# Patient Record
Sex: MALE | Race: BLACK OR AFRICAN AMERICAN | NOT HISPANIC OR LATINO | URBAN - METROPOLITAN AREA
[De-identification: names, ages, dates, MRNs, and addresses within clinical notes are randomized per-mention and may not be internally consistent; named-entity substitution may affect disease eponyms.]

---

## 2023-06-21 ENCOUNTER — APPOINTMENT (RX ONLY)
Dept: URBAN - METROPOLITAN AREA CLINIC 66 | Facility: CLINIC | Age: 48
Setting detail: DERMATOLOGY
End: 2023-06-21

## 2023-06-21 DIAGNOSIS — R21 RASH AND OTHER NONSPECIFIC SKIN ERUPTION: ICD-10-CM | Status: INADEQUATELY CONTROLLED

## 2023-06-21 PROCEDURE — ? ORDER TESTS

## 2023-06-21 PROCEDURE — 99204 OFFICE O/P NEW MOD 45 MIN: CPT

## 2023-06-21 PROCEDURE — ? FULL BODY SKIN EXAM - DECLINED

## 2023-06-21 PROCEDURE — ? PRESCRIPTION MEDICATION MANAGEMENT

## 2023-06-21 PROCEDURE — ? PRESCRIPTION

## 2023-06-21 PROCEDURE — ? COUNSELING

## 2023-06-21 PROCEDURE — ? MEDICATION COUNSELING

## 2023-06-21 PROCEDURE — ? ADDITIONAL NOTES

## 2023-06-21 RX ORDER — TACROLIMUS 1 MG/G
OINTMENT TOPICAL
Qty: 60 | Refills: 2 | Status: ERX | COMMUNITY
Start: 2023-06-21

## 2023-06-21 RX ADMIN — TACROLIMUS: 1 OINTMENT TOPICAL at 00:00

## 2023-06-21 ASSESSMENT — LOCATION SIMPLE DESCRIPTION DERM: LOCATION SIMPLE: RIGHT LIP

## 2023-06-21 ASSESSMENT — LOCATION DETAILED DESCRIPTION DERM
LOCATION DETAILED: RIGHT INFERIOR VERMILION LIP
LOCATION DETAILED: RIGHT SUPERIOR VERMILION LIP

## 2023-06-21 ASSESSMENT — LOCATION ZONE DERM: LOCATION ZONE: LIP

## 2023-06-21 NOTE — PROCEDURE: ADDITIONAL NOTES
Detail Level: Simple
Render Risk Assessment In Note?: no
Additional Notes: Pt states that he had an adverse reaction to something given orally in the ER at [facility]-will contact us with what oral abx/steroid was given that caused his consistent hiccups and we will avoid this. If it is oral prednisone, will have pt return for possible IMK. Advised to take Zyrtec daily and Benadryl QHS PRN. Discussed that as he has had a complete STD workup to not be concerned for STDs as it does not also appear to be.

## 2023-06-21 NOTE — PROCEDURE: ORDER TESTS
Billing Type: Third-Party Bill
Bill For Surgical Tray: no
Expected Date Of Service: 06/21/2023
Performing Laboratory: [id number]

## 2023-06-21 NOTE — PROCEDURE: PRESCRIPTION MEDICATION MANAGEMENT
Plan: Discussed with pt that swelling of the lips is most likely an allergic reaction. Tx goal is to reduce swelling and inflammation. Will give lab slip for allergy panel and recommended f/u with allergist.\\n\\nReassured pt that the skin of his lips are smooth and that hyperpigmentation is likely secondary to inflammation and swelling. \\n\\Kody questions answered.
Initiate Treatment: Tacrolimus 0.1% bid x 6 weeks on, 1 week off PRN
Detail Level: Zone
Render In Strict Bullet Format?: No

## 2023-06-22 ENCOUNTER — RX ONLY (OUTPATIENT)
Age: 48
Setting detail: RX ONLY
End: 2023-06-22

## 2023-06-22 RX ORDER — PIMECROLIMUS 10 MG/G
CREAM TOPICAL
Qty: 30 | Refills: 6 | Status: ERX | COMMUNITY
Start: 2023-06-22

## 2023-06-26 ENCOUNTER — RX ONLY (OUTPATIENT)
Age: 48
Setting detail: RX ONLY
End: 2023-06-26

## 2023-06-26 RX ORDER — TACROLIMUS 1 MG/G
OINTMENT TOPICAL
Qty: 30 | Refills: 1 | Status: ERX